# Patient Record
Sex: MALE | Race: ASIAN | NOT HISPANIC OR LATINO
[De-identification: names, ages, dates, MRNs, and addresses within clinical notes are randomized per-mention and may not be internally consistent; named-entity substitution may affect disease eponyms.]

---

## 2018-09-26 PROBLEM — Z00.00 ENCOUNTER FOR PREVENTIVE HEALTH EXAMINATION: Status: ACTIVE | Noted: 2018-09-26

## 2022-01-31 ENCOUNTER — APPOINTMENT (OUTPATIENT)
Dept: OTOLARYNGOLOGY | Facility: CLINIC | Age: 52
End: 2022-01-31
Payer: COMMERCIAL

## 2022-01-31 ENCOUNTER — NON-APPOINTMENT (OUTPATIENT)
Age: 52
End: 2022-01-31

## 2022-01-31 VITALS — TEMPERATURE: 97.6 F | BODY MASS INDEX: 24.33 KG/M2 | WEIGHT: 155 LBS | HEIGHT: 67 IN

## 2022-01-31 DIAGNOSIS — M26.609 UNSPECIFIED TEMPOROMANDIBULAR JOINT DISORDER: ICD-10-CM

## 2022-01-31 DIAGNOSIS — J32.0 CHRONIC MAXILLARY SINUSITIS: ICD-10-CM

## 2022-01-31 DIAGNOSIS — H90.3 SENSORINEURAL HEARING LOSS, BILATERAL: ICD-10-CM

## 2022-01-31 DIAGNOSIS — H69.83 OTHER SPECIFIED DISORDERS OF EUSTACHIAN TUBE, BILATERAL: ICD-10-CM

## 2022-01-31 DIAGNOSIS — M26.69 OTHER SPECIFIED DISORDERS OF TEMPOROMANDIBULAR JOINT: ICD-10-CM

## 2022-01-31 PROCEDURE — 31231 NASAL ENDOSCOPY DX: CPT

## 2022-01-31 PROCEDURE — 92567 TYMPANOMETRY: CPT

## 2022-01-31 PROCEDURE — 92557 COMPREHENSIVE HEARING TEST: CPT

## 2022-01-31 PROCEDURE — 99204 OFFICE O/P NEW MOD 45 MIN: CPT | Mod: 25

## 2022-01-31 RX ORDER — ESCITALOPRAM OXALATE 10 MG/1
10 TABLET ORAL
Qty: 90 | Refills: 0 | Status: ACTIVE | COMMUNITY
Start: 2021-12-01

## 2022-01-31 RX ORDER — ICOSAPENT ETHYL 1000 MG/1
1 CAPSULE ORAL
Qty: 120 | Refills: 0 | Status: ACTIVE | COMMUNITY
Start: 2022-01-12

## 2022-01-31 RX ORDER — FENOFIBRATE 145 MG/1
145 TABLET, COATED ORAL
Qty: 90 | Refills: 0 | Status: ACTIVE | COMMUNITY
Start: 2021-12-01

## 2022-01-31 RX ORDER — LORATADINE 5 MG/5 ML
SOLUTION, ORAL ORAL
Refills: 0 | Status: ACTIVE | COMMUNITY

## 2022-01-31 RX ORDER — BUDESONIDE 0.5 MG/2ML
0.5 INHALANT ORAL
Qty: 120 | Refills: 0 | Status: ACTIVE | COMMUNITY
Start: 2021-12-29

## 2022-01-31 RX ORDER — CALCIUM CARBONATE 260MG(650)
TABLET,CHEWABLE ORAL
Refills: 0 | Status: ACTIVE | COMMUNITY

## 2022-01-31 RX ORDER — MONTELUKAST 10 MG/1
10 TABLET, FILM COATED ORAL
Qty: 90 | Refills: 0 | Status: ACTIVE | COMMUNITY
Start: 2021-12-01

## 2022-01-31 RX ORDER — OMEPRAZOLE 20 MG/1
20 CAPSULE, DELAYED RELEASE ORAL
Qty: 90 | Refills: 0 | Status: ACTIVE | COMMUNITY
Start: 2021-11-12

## 2022-01-31 NOTE — DATA REVIEWED
[de-identified] : Right ear is within normal limits. 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

## 2022-01-31 NOTE — REVIEW OF SYSTEMS
[Post Nasal Drip] : post nasal drip [Ear Pain] : ear pain [Recurrent Sinus Infections] : recurrent sinus infections [Sinus Pain] : sinus pain [Sinus Pressure] : sinus pressure [Negative] : Heme/Lymph

## 2022-01-31 NOTE — HISTORY OF PRESENT ILLNESS
[de-identified] : c/o ears feeling clogged.  Hx of sinus issues in past.  Had been on freq abx.  Did see ENT one year ago and told of need of possible balloon surgery.  - Second ENT told of problem with TMJ. Also has used budesonide in nose.   Occ headaches over eyes several weeks. ago. Also takes claretin D.  Also on singulaire.  Hx of allergy testing.  - multiple allergies without treatment. No change in hearing.  Last CT of sinuses done one year ago.   (may have had prior inferior turb reduction acc to patient )

## 2022-01-31 NOTE — PHYSICAL EXAM
[Midline] : trachea located in midline position [Normal] : no rashes [de-identified] : sl tender tmj  [Nasal Endoscopy Performed] : nasal endoscopy was performed, see procedure section for findings [] : septum deviated to the right [de-identified] : mod hypertrophy bilat

## 2022-01-31 NOTE — ASSESSMENT
[FreeTextEntry1] : Patient with occ feeling of clogged ears.  Hx of allergies and hx of prior sinus procedure.  ? may have had inferior turb reduction.  No evidence of sinusitis.  Minimal findings on prior sinus films from one year ago. Audio and tymp normal  - feel issue is likely ETD and TMJ issue.  Agree with allergy treatment and budesonide irrigations and also recommended warm compresses, NSAIDS and also consideration of tmj eval with dentist for oral appliance.  Follow up as necessary

## 2024-01-03 ENCOUNTER — NON-APPOINTMENT (OUTPATIENT)
Age: 54
End: 2024-01-03

## 2024-01-04 ENCOUNTER — APPOINTMENT (OUTPATIENT)
Dept: OTOLARYNGOLOGY | Facility: CLINIC | Age: 54
End: 2024-01-04
Payer: COMMERCIAL

## 2024-01-04 VITALS
HEART RATE: 79 BPM | OXYGEN SATURATION: 96 % | HEIGHT: 67 IN | WEIGHT: 155 LBS | BODY MASS INDEX: 24.33 KG/M2 | RESPIRATION RATE: 17 BRPM

## 2024-01-04 VITALS — DIASTOLIC BLOOD PRESSURE: 84 MMHG | SYSTOLIC BLOOD PRESSURE: 135 MMHG | HEART RATE: 84 BPM

## 2024-01-04 DIAGNOSIS — J34.2 DEVIATED NASAL SEPTUM: ICD-10-CM

## 2024-01-04 DIAGNOSIS — J34.3 HYPERTROPHY OF NASAL TURBINATES: ICD-10-CM

## 2024-01-04 DIAGNOSIS — F17.200 NICOTINE DEPENDENCE, UNSPECIFIED, UNCOMPLICATED: ICD-10-CM

## 2024-01-04 PROCEDURE — 99204 OFFICE O/P NEW MOD 45 MIN: CPT | Mod: 25

## 2024-01-04 PROCEDURE — 31231 NASAL ENDOSCOPY DX: CPT

## 2024-01-04 RX ORDER — MOMETASONE FUROATE 1 MG/ML
0.1 SOLUTION TOPICAL
Qty: 30 | Refills: 2 | Status: ACTIVE | COMMUNITY
Start: 2024-01-04 | End: 1900-01-01

## 2024-01-04 RX ORDER — ROSUVASTATIN CALCIUM 5 MG/1
TABLET, FILM COATED ORAL
Refills: 0 | Status: ACTIVE | COMMUNITY

## 2024-01-04 RX ORDER — AMOXICILLIN AND CLAVULANATE POTASSIUM 875; 125 MG/1; MG/1
875-125 TABLET, COATED ORAL
Qty: 28 | Refills: 0 | Status: COMPLETED | COMMUNITY
Start: 2021-11-03 | End: 2024-01-04

## 2024-01-04 RX ORDER — METHYLPREDNISOLONE 4 MG/1
4 TABLET ORAL
Qty: 21 | Refills: 0 | Status: COMPLETED | COMMUNITY
Start: 2021-11-03 | End: 2024-01-04

## 2024-01-04 NOTE — HISTORY OF PRESENT ILLNESS
[de-identified] : 53 year old male presents with clogged ears and sinus pressure/ pain for many years  Reports seeing multiple ENT in the past for recurrent sinus infections- has been treated with methylprednisolone and multiple rounds of antibiotics  Reports constant sinus pressure/ pain and eye pressure. States he occasionally feels lethargic and it has been altering his daily activities  Reports occasional otalgia and at least 6 ear infections associated with sinus infections within a year.  Reports occasional PND and TMJ (uses mouth guard at night) Taking Claritin D, singular daily and Flonase 2x a day.  Patient denies otorrhea, hearing loss, tinnitus, dizziness, vertigo, headaches related to hearing.  Has tried humidifiers with no relief.  Denies nasal discharge, epistaxis or recent fevers  Denies the use of saline irrigations or saline sprays.  Last CT Maxillary sinus 11/02/2020- Sinusitis as detailed above with deviation of the nasal septum and narrowing of the nasal cavities.

## 2024-01-04 NOTE — PROCEDURE
[FreeTextEntry6] : Procedure: Bilateral nasal endoscopy (CPT 24540)   Indication: Anterior rhinoscopy was inadequate to evaluate pathology.  Left: Septum midline Moderate inferior turbinate hypertrophy  Middle meatus clear, without masses, polyps, or pus Sphenoethmoidal recess clear, without masses, polyps, or pus  Right:  Septum midline Moderate inferior turbinate hypertrophy Middle meatus clear, without masses, polyps, or pus  Sphenoethmoidal recess clear, without masses, polyps, or pus

## 2024-01-04 NOTE — REASON FOR VISIT
[Initial Evaluation] : an initial evaluation for [FreeTextEntry2] : clogged ears and sinus pressure/ pain

## 2024-01-04 NOTE — PLAN
[TextEntry] : I recommend a trial of DDM BID.   If he has acute illness, he will call and we will obtain CT.   Fu 3 months

## 2024-02-15 ENCOUNTER — NON-APPOINTMENT (OUTPATIENT)
Age: 54
End: 2024-02-15

## 2025-09-10 ENCOUNTER — NON-APPOINTMENT (OUTPATIENT)
Age: 55
End: 2025-09-10

## 2025-09-12 ENCOUNTER — APPOINTMENT (OUTPATIENT)
Dept: OTOLARYNGOLOGY | Facility: CLINIC | Age: 55
End: 2025-09-12
Payer: COMMERCIAL

## 2025-09-12 DIAGNOSIS — J01.91 ACUTE RECURRENT SINUSITIS, UNSPECIFIED: ICD-10-CM

## 2025-09-12 DIAGNOSIS — J34.2 DEVIATED NASAL SEPTUM: ICD-10-CM

## 2025-09-12 DIAGNOSIS — J34.3 HYPERTROPHY OF NASAL TURBINATES: ICD-10-CM

## 2025-09-12 PROCEDURE — 31231 NASAL ENDOSCOPY DX: CPT

## 2025-09-12 PROCEDURE — 99213 OFFICE O/P EST LOW 20 MIN: CPT | Mod: 25

## 2025-09-12 RX ORDER — MOMETASONE FUROATE 1 MG/ML
0.1 SOLUTION TOPICAL
Qty: 3 | Refills: 2 | Status: ACTIVE | COMMUNITY
Start: 2025-09-12 | End: 1900-01-01